# Patient Record
Sex: MALE | Race: WHITE | NOT HISPANIC OR LATINO | Employment: FULL TIME | URBAN - METROPOLITAN AREA
[De-identification: names, ages, dates, MRNs, and addresses within clinical notes are randomized per-mention and may not be internally consistent; named-entity substitution may affect disease eponyms.]

---

## 2023-09-22 ENCOUNTER — OFFICE VISIT (OUTPATIENT)
Dept: PRIMARY CARE | Facility: CLINIC | Age: 33
End: 2023-09-22

## 2023-09-22 VITALS
BODY MASS INDEX: 38.36 KG/M2 | WEIGHT: 315 LBS | SYSTOLIC BLOOD PRESSURE: 128 MMHG | HEIGHT: 76 IN | DIASTOLIC BLOOD PRESSURE: 85 MMHG | HEART RATE: 96 BPM | OXYGEN SATURATION: 94 %

## 2023-09-22 DIAGNOSIS — E78.5 HYPERLIPIDEMIA, UNSPECIFIED HYPERLIPIDEMIA TYPE: ICD-10-CM

## 2023-09-22 DIAGNOSIS — R22.9 MULTIPLE SKIN NODULES: ICD-10-CM

## 2023-09-22 DIAGNOSIS — E03.9 HYPOTHYROIDISM, UNSPECIFIED TYPE: Primary | ICD-10-CM

## 2023-09-22 DIAGNOSIS — M25.50 ARTHRALGIA, UNSPECIFIED JOINT: ICD-10-CM

## 2023-09-22 DIAGNOSIS — R73.9 HYPERGLYCEMIA: ICD-10-CM

## 2023-09-22 PROBLEM — E04.9 NON-TOXIC GOITER: Status: ACTIVE | Noted: 2023-09-22

## 2023-09-22 PROBLEM — M79.671 RIGHT FOOT PAIN: Status: RESOLVED | Noted: 2023-09-22 | Resolved: 2023-09-22

## 2023-09-22 PROBLEM — G47.9 SLEEP DISORDER: Status: ACTIVE | Noted: 2023-09-22

## 2023-09-22 PROBLEM — M54.42 ACUTE LEFT-SIDED LOW BACK PAIN WITH LEFT-SIDED SCIATICA: Status: ACTIVE | Noted: 2023-09-22

## 2023-09-22 PROBLEM — E66.9 OBESITY, UNSPECIFIED: Status: ACTIVE | Noted: 2023-09-22

## 2023-09-22 PROBLEM — R26.89 OTHER ABNORMALITIES OF GAIT AND MOBILITY: Status: ACTIVE | Noted: 2023-09-22

## 2023-09-22 PROBLEM — I87.309 CHRONIC PERIPHERAL VENOUS HYPERTENSION: Status: ACTIVE | Noted: 2023-09-22

## 2023-09-22 PROBLEM — R60.0 LOCALIZED EDEMA: Status: ACTIVE | Noted: 2023-09-22

## 2023-09-22 PROBLEM — E06.3 AUTOIMMUNE THYROIDITIS: Status: ACTIVE | Noted: 2023-09-22

## 2023-09-22 LAB — POC FINGERSTICK BLOOD GLUCOSE: 132 MG/DL (ref 70–100)

## 2023-09-22 PROCEDURE — 1036F TOBACCO NON-USER: CPT | Performed by: FAMILY MEDICINE

## 2023-09-22 PROCEDURE — 82962 GLUCOSE BLOOD TEST: CPT | Performed by: FAMILY MEDICINE

## 2023-09-22 PROCEDURE — 99203 OFFICE O/P NEW LOW 30 MIN: CPT | Performed by: FAMILY MEDICINE

## 2023-09-22 RX ORDER — LEVOTHYROXINE SODIUM 300 UG/1
300 TABLET ORAL
COMMUNITY
End: 2023-09-22 | Stop reason: SDUPTHER

## 2023-09-22 RX ORDER — IBUPROFEN 800 MG/1
600 TABLET ORAL 2 TIMES DAILY
COMMUNITY

## 2023-09-22 RX ORDER — LEVOTHYROXINE SODIUM 300 UG/1
300 TABLET ORAL
Qty: 30 TABLET | Refills: 1 | Status: SHIPPED | OUTPATIENT
Start: 2023-09-22 | End: 2023-11-01 | Stop reason: SDUPTHER

## 2023-09-22 RX ORDER — ACETAMINOPHEN 500 MG
1000 TABLET ORAL
COMMUNITY

## 2023-09-22 ASSESSMENT — ENCOUNTER SYMPTOMS
DYSURIA: 0
VOMITING: 0
ABDOMINAL PAIN: 0
LOSS OF SENSATION IN FEET: 0
CHILLS: 0
BLOOD IN STOOL: 0
NAUSEA: 0
TROUBLE SWALLOWING: 0
LIGHT-HEADEDNESS: 0
DIFFICULTY URINATING: 0
CONFUSION: 0
WEAKNESS: 0
NUMBNESS: 0
SHORTNESS OF BREATH: 0
WHEEZING: 0
COUGH: 0
UNEXPECTED WEIGHT CHANGE: 0
DIZZINESS: 0
FEVER: 0
DIARRHEA: 0
OCCASIONAL FEELINGS OF UNSTEADINESS: 1
DEPRESSION: 1

## 2023-09-22 ASSESSMENT — PATIENT HEALTH QUESTIONNAIRE - PHQ9
6. FEELING BAD ABOUT YOURSELF - OR THAT YOU ARE A FAILURE OR HAVE LET YOURSELF OR YOUR FAMILY DOWN: NEARLY EVERY DAY
4. FEELING TIRED OR HAVING LITTLE ENERGY: NEARLY EVERY DAY
9. THOUGHTS THAT YOU WOULD BE BETTER OFF DEAD, OR OF HURTING YOURSELF: NOT AT ALL
1. LITTLE INTEREST OR PLEASURE IN DOING THINGS: NEARLY EVERY DAY
10. IF YOU CHECKED OFF ANY PROBLEMS, HOW DIFFICULT HAVE THESE PROBLEMS MADE IT FOR YOU TO DO YOUR WORK, TAKE CARE OF THINGS AT HOME, OR GET ALONG WITH OTHER PEOPLE: NOT DIFFICULT AT ALL
2. FEELING DOWN, DEPRESSED OR HOPELESS: NEARLY EVERY DAY
3. TROUBLE FALLING OR STAYING ASLEEP OR SLEEPING TOO MUCH: NOT AT ALL
SUM OF ALL RESPONSES TO PHQ QUESTIONS 1-9: 15
8. MOVING OR SPEAKING SO SLOWLY THAT OTHER PEOPLE COULD HAVE NOTICED. OR THE OPPOSITE, BEING SO FIGETY OR RESTLESS THAT YOU HAVE BEEN MOVING AROUND A LOT MORE THAN USUAL: NOT AT ALL
SUM OF ALL RESPONSES TO PHQ9 QUESTIONS 1 AND 2: 6
5. POOR APPETITE OR OVEREATING: NEARLY EVERY DAY
7. TROUBLE CONCENTRATING ON THINGS, SUCH AS READING THE NEWSPAPER OR WATCHING TELEVISION: NOT AT ALL

## 2023-09-22 NOTE — ASSESSMENT & PLAN NOTE
Right hand likely ganglion cyst. Extensor surfaces of the elbows have appearance of hidradenitis, but non-tender and non-flexural.

## 2023-09-22 NOTE — PATIENT INSTRUCTIONS
Try to minimize ibuprofen and acetaminophen; use the lowest effective dose.    Please return for a follow-up appointment in 3 months, earlier if any question or concern.     Please return or seek medical attention if symptoms persist, change, worsen, or return. For emergencies, call 9-1-1 or go to the nearest Emergency Room.       For assistance with scheduling referrals or consultations, please call [phone]. For laboratory, radiology, and other tests, please call [phone] ([phone] for pediatrics). Please review prescription inserts and published information for possible adverse effects of all medications. Return after testing or consultation to review results and recommendations, if symptoms persist, change, worsen, or return, or if you have any question or concern. If you do not get results within 7-10 days, or you have any question or concern, please send a message, call the office ([phone]), or return to the office for a follow-up appointment. For non-emergencies, you may call the office. For emergencies, call 9-1-1 or go to the nearest Emergency Department. Please schedule additional appointment(s) to address concern(s) not addressed today.    In general, results are not released or discussed over the telephone. Results of tests done through [facility] are released via  [employer] (see below).  [email or web address]   [employer] support line: [phone]    Until we complete our transition to the new system, additional information can be found at [email or web address] or on your Android or iOS (iPhone, iPad) device using the [employer] mitali available free of charge in your device's mitali store.

## 2023-09-22 NOTE — PROGRESS NOTES
"Subjective   Patient ID: Kodi Truong is a 32 y.o. male who presents for Establish Care (Pt presents  as new pt to establish care, will need rx's, discuss something for his pain feels he is using too much Tylenol & Ibuprofen.BL/).  HPI    Previously a patient of Dr. Kiel Pemberton, last seen 3y ago.     Stopped taking Levothyroxine Dec 2022, started to feel unwell April 2023, got a prescription for levothyroxine from urgent care, has been taking 300 mcg (his previous dose) for the last 3 weeks.    Was taking ibuprofen regularly, then combined with acetaminophen, working fairly well. Usually takes ibuprofen 600 mg plus acetaminophen 1000 mg once in the morning, occasionally to rarely in the evening.      Review of Systems   Constitutional:  Negative for chills, fever and unexpected weight change.   HENT:  Negative for ear pain and trouble swallowing.    Respiratory:  Negative for cough, shortness of breath and wheezing.    Cardiovascular:  Negative for chest pain.   Gastrointestinal:  Negative for abdominal pain, blood in stool, diarrhea, nausea and vomiting.   Genitourinary:  Negative for difficulty urinating and dysuria.   Skin:  Negative for rash.   Neurological:  Negative for dizziness, syncope, weakness, light-headedness and numbness.   Psychiatric/Behavioral:  Negative for behavioral problems and confusion.          Objective   /85   Pulse 96   Ht 1.93 m (6' 4\")   Wt (!) 225 kg (497 lb)   SpO2 94%   BMI 60.50 kg/m²     Physical Exam  Vitals and nursing note reviewed.   Constitutional:       General: He is not in acute distress.     Appearance: Normal appearance.   HENT:      Head: Normocephalic and atraumatic.   Eyes:      General: No scleral icterus.     Extraocular Movements: Extraocular movements intact.      Conjunctiva/sclera: Conjunctivae normal.   Pulmonary:      Effort: Pulmonary effort is normal. No respiratory distress.   Musculoskeletal:      Right hand: Deformity (large subcutaneous " non-tender cystic mass dorsal PIP) present.      Left hand: No deformity.   Skin:     General: Skin is warm and dry.      Coloration: Skin is not jaundiced.      Findings: Rash present. Rash is nodular (extensor surfaces of the elbows).   Neurological:      Mental Status: He is alert and oriented to person, place, and time. Mental status is at baseline.   Psychiatric:         Mood and Affect: Mood normal.         Thought Content: Thought content normal.         Assessment/Plan   Problem List Items Addressed This Visit       Hyperlipidemia     Recheck after TSH stabilized or close to the reference range for a few months.         Relevant Orders    Comprehensive Metabolic Panel    Hypothyroidism - Primary     Continue Levothyroxine 300 mcg, and check TSH after having been back on it for at least 6 weeks (so in 3-4 weeks).         Relevant Medications    levothyroxine (Synthroid, Unithroid) 300 mcg tablet    Other Relevant Orders    TSH with reflex to Free T4 if abnormal    Hyperglycemia     R/o severe hyperglycemia today. Check A1c with labs.         Relevant Orders    Hemoglobin A1C    POCT Fingerstick Glucose manually resulted (Completed)    Arthralgia     Check uric acid.         Relevant Orders    Uric acid    Multiple skin nodules     Right hand likely ganglion cyst. Extensor surfaces of the elbows have appearance of hidradenitis, but non-tender and non-flexural.

## 2023-09-22 NOTE — ASSESSMENT & PLAN NOTE
Continue Levothyroxine 300 mcg, and check TSH after having been back on it for at least 6 weeks (so in 3-4 weeks).

## 2023-10-09 ENCOUNTER — APPOINTMENT (OUTPATIENT)
Dept: PRIMARY CARE | Facility: CLINIC | Age: 33
End: 2023-10-09

## 2023-10-24 ENCOUNTER — LAB (OUTPATIENT)
Dept: LAB | Facility: LAB | Age: 33
End: 2023-10-24

## 2023-10-24 DIAGNOSIS — E78.5 HYPERLIPIDEMIA, UNSPECIFIED HYPERLIPIDEMIA TYPE: ICD-10-CM

## 2023-10-24 DIAGNOSIS — E03.9 HYPOTHYROIDISM, UNSPECIFIED TYPE: ICD-10-CM

## 2023-10-24 DIAGNOSIS — M25.50 ARTHRALGIA, UNSPECIFIED JOINT: ICD-10-CM

## 2023-10-24 DIAGNOSIS — R73.9 HYPERGLYCEMIA: ICD-10-CM

## 2023-10-24 LAB
ALBUMIN SERPL BCP-MCNC: 3.8 G/DL (ref 3.4–5)
ALP SERPL-CCNC: 72 U/L (ref 33–120)
ALT SERPL W P-5'-P-CCNC: 34 U/L (ref 10–52)
ANION GAP SERPL CALC-SCNC: 14 MMOL/L (ref 10–20)
AST SERPL W P-5'-P-CCNC: 19 U/L (ref 9–39)
BILIRUB SERPL-MCNC: 0.4 MG/DL (ref 0–1.2)
BUN SERPL-MCNC: 14 MG/DL (ref 6–23)
CALCIUM SERPL-MCNC: 9.3 MG/DL (ref 8.6–10.3)
CHLORIDE SERPL-SCNC: 104 MMOL/L (ref 98–107)
CO2 SERPL-SCNC: 27 MMOL/L (ref 21–32)
CREAT SERPL-MCNC: 0.78 MG/DL (ref 0.5–1.3)
EST. AVERAGE GLUCOSE BLD GHB EST-MCNC: 128 MG/DL
GFR SERPL CREATININE-BSD FRML MDRD: >90 ML/MIN/1.73M*2
GLUCOSE SERPL-MCNC: 99 MG/DL (ref 74–99)
HBA1C MFR BLD: 6.1 %
POTASSIUM SERPL-SCNC: 4.6 MMOL/L (ref 3.5–5.3)
PROT SERPL-MCNC: 6.8 G/DL (ref 6.4–8.2)
SODIUM SERPL-SCNC: 140 MMOL/L (ref 136–145)
TSH SERPL-ACNC: 1.51 MIU/L (ref 0.44–3.98)
URATE SERPL-MCNC: 9.4 MG/DL (ref 4–7.5)

## 2023-10-24 PROCEDURE — 84550 ASSAY OF BLOOD/URIC ACID: CPT

## 2023-10-24 PROCEDURE — 83036 HEMOGLOBIN GLYCOSYLATED A1C: CPT

## 2023-10-24 PROCEDURE — 84443 ASSAY THYROID STIM HORMONE: CPT

## 2023-10-24 PROCEDURE — 36415 COLL VENOUS BLD VENIPUNCTURE: CPT

## 2023-10-24 PROCEDURE — 80053 COMPREHEN METABOLIC PANEL: CPT

## 2023-11-01 DIAGNOSIS — E03.9 HYPOTHYROIDISM, UNSPECIFIED TYPE: ICD-10-CM

## 2023-11-06 DIAGNOSIS — E79.0 HYPERURICEMIA: Primary | ICD-10-CM

## 2023-11-06 DIAGNOSIS — R73.03 PREDIABETES: ICD-10-CM

## 2023-11-06 RX ORDER — LEVOTHYROXINE SODIUM 300 UG/1
300 TABLET ORAL
Qty: 90 TABLET | Refills: 1 | Status: SHIPPED | OUTPATIENT
Start: 2023-11-06 | End: 2024-06-01 | Stop reason: SDUPTHER

## 2024-05-28 PROBLEM — M54.42 ACUTE LEFT-SIDED LOW BACK PAIN WITH LEFT-SIDED SCIATICA: Status: RESOLVED | Noted: 2023-09-22 | Resolved: 2024-05-28

## 2024-06-01 ENCOUNTER — LAB (OUTPATIENT)
Dept: LAB | Facility: LAB | Age: 34
End: 2024-06-01
Payer: COMMERCIAL

## 2024-06-01 ENCOUNTER — OFFICE VISIT (OUTPATIENT)
Dept: PRIMARY CARE | Facility: CLINIC | Age: 34
End: 2024-06-01
Payer: COMMERCIAL

## 2024-06-01 VITALS
WEIGHT: 315 LBS | OXYGEN SATURATION: 96 % | BODY MASS INDEX: 38.36 KG/M2 | DIASTOLIC BLOOD PRESSURE: 80 MMHG | SYSTOLIC BLOOD PRESSURE: 139 MMHG | HEART RATE: 63 BPM | HEIGHT: 76 IN | TEMPERATURE: 97.7 F

## 2024-06-01 DIAGNOSIS — R73.03 PREDIABETES: ICD-10-CM

## 2024-06-01 DIAGNOSIS — M1A.09X0 CHRONIC GOUT OF MULTIPLE SITES, UNSPECIFIED CAUSE: ICD-10-CM

## 2024-06-01 DIAGNOSIS — Z00.00 WELL ADULT HEALTH CHECK: Primary | ICD-10-CM

## 2024-06-01 DIAGNOSIS — K42.9 UMBILICAL HERNIA WITHOUT OBSTRUCTION AND WITHOUT GANGRENE: ICD-10-CM

## 2024-06-01 DIAGNOSIS — E78.5 HYPERLIPIDEMIA, UNSPECIFIED HYPERLIPIDEMIA TYPE: ICD-10-CM

## 2024-06-01 DIAGNOSIS — M67.40 GANGLION: ICD-10-CM

## 2024-06-01 DIAGNOSIS — E03.9 HYPOTHYROIDISM, UNSPECIFIED TYPE: ICD-10-CM

## 2024-06-01 LAB
ALBUMIN SERPL BCP-MCNC: 4.4 G/DL (ref 3.4–5)
ALP SERPL-CCNC: 83 U/L (ref 33–120)
ALT SERPL W P-5'-P-CCNC: 98 U/L (ref 10–52)
ANION GAP SERPL CALC-SCNC: 15 MMOL/L (ref 10–20)
AST SERPL W P-5'-P-CCNC: 59 U/L (ref 9–39)
BILIRUB SERPL-MCNC: 0.6 MG/DL (ref 0–1.2)
BUN SERPL-MCNC: 16 MG/DL (ref 6–23)
CALCIUM SERPL-MCNC: 9.7 MG/DL (ref 8.6–10.3)
CHLORIDE SERPL-SCNC: 103 MMOL/L (ref 98–107)
CHOLEST SERPL-MCNC: 206 MG/DL (ref 0–199)
CHOLESTEROL/HDL RATIO: 6.3
CK SERPL-CCNC: 192 U/L (ref 0–325)
CO2 SERPL-SCNC: 25 MMOL/L (ref 21–32)
CREAT SERPL-MCNC: 0.94 MG/DL (ref 0.5–1.3)
EGFRCR SERPLBLD CKD-EPI 2021: >90 ML/MIN/1.73M*2
ERYTHROCYTE [DISTWIDTH] IN BLOOD BY AUTOMATED COUNT: 15.2 % (ref 11.5–14.5)
EST. AVERAGE GLUCOSE BLD GHB EST-MCNC: 148 MG/DL
GLUCOSE SERPL-MCNC: 101 MG/DL (ref 74–99)
HBA1C MFR BLD: 6.8 %
HCT VFR BLD AUTO: 42.8 % (ref 41–52)
HDLC SERPL-MCNC: 32.5 MG/DL
HGB BLD-MCNC: 13 G/DL (ref 13.5–17.5)
LDLC SERPL CALC-MCNC: 138 MG/DL
MCH RBC QN AUTO: 25.6 PG (ref 26–34)
MCHC RBC AUTO-ENTMCNC: 30.4 G/DL (ref 32–36)
MCV RBC AUTO: 84 FL (ref 80–100)
NON HDL CHOLESTEROL: 174 MG/DL (ref 0–149)
NRBC BLD-RTO: 0 /100 WBCS (ref 0–0)
PLATELET # BLD AUTO: 349 X10*3/UL (ref 150–450)
POTASSIUM SERPL-SCNC: 4.4 MMOL/L (ref 3.5–5.3)
PROT SERPL-MCNC: 7.6 G/DL (ref 6.4–8.2)
RBC # BLD AUTO: 5.08 X10*6/UL (ref 4.5–5.9)
SODIUM SERPL-SCNC: 139 MMOL/L (ref 136–145)
T4 FREE SERPL-MCNC: 1.2 NG/DL (ref 0.61–1.12)
TRIGL SERPL-MCNC: 177 MG/DL (ref 0–149)
TSH SERPL-ACNC: 5.69 MIU/L (ref 0.44–3.98)
URATE SERPL-MCNC: 11.9 MG/DL (ref 4–7.5)
VLDL: 35 MG/DL (ref 0–40)
WBC # BLD AUTO: 8.3 X10*3/UL (ref 4.4–11.3)

## 2024-06-01 PROCEDURE — 84439 ASSAY OF FREE THYROXINE: CPT

## 2024-06-01 PROCEDURE — 90715 TDAP VACCINE 7 YRS/> IM: CPT | Performed by: FAMILY MEDICINE

## 2024-06-01 PROCEDURE — 99395 PREV VISIT EST AGE 18-39: CPT | Performed by: FAMILY MEDICINE

## 2024-06-01 PROCEDURE — 90471 IMMUNIZATION ADMIN: CPT | Performed by: FAMILY MEDICINE

## 2024-06-01 PROCEDURE — 84443 ASSAY THYROID STIM HORMONE: CPT

## 2024-06-01 PROCEDURE — 80053 COMPREHEN METABOLIC PANEL: CPT

## 2024-06-01 PROCEDURE — 1036F TOBACCO NON-USER: CPT | Performed by: FAMILY MEDICINE

## 2024-06-01 PROCEDURE — 80061 LIPID PANEL: CPT

## 2024-06-01 PROCEDURE — 99214 OFFICE O/P EST MOD 30 MIN: CPT | Performed by: FAMILY MEDICINE

## 2024-06-01 PROCEDURE — 85027 COMPLETE CBC AUTOMATED: CPT

## 2024-06-01 PROCEDURE — 83036 HEMOGLOBIN GLYCOSYLATED A1C: CPT

## 2024-06-01 PROCEDURE — 84550 ASSAY OF BLOOD/URIC ACID: CPT

## 2024-06-01 PROCEDURE — 82550 ASSAY OF CK (CPK): CPT

## 2024-06-01 PROCEDURE — 36415 COLL VENOUS BLD VENIPUNCTURE: CPT

## 2024-06-01 RX ORDER — COLCHICINE 0.6 MG/1
TABLET ORAL
Qty: 60 TABLET | Refills: 2 | Status: SHIPPED | OUTPATIENT
Start: 2024-06-01

## 2024-06-01 RX ORDER — ALLOPURINOL 100 MG/1
100 TABLET ORAL 2 TIMES DAILY
Qty: 60 TABLET | Refills: 11 | Status: SHIPPED | OUTPATIENT
Start: 2024-06-01 | End: 2024-06-01

## 2024-06-01 RX ORDER — LEVOTHYROXINE SODIUM 300 UG/1
300 TABLET ORAL
Qty: 90 TABLET | Refills: 1 | Status: SHIPPED | OUTPATIENT
Start: 2024-06-01

## 2024-06-01 RX ORDER — ALLOPURINOL 100 MG/1
100 TABLET ORAL DAILY
Qty: 30 TABLET | Refills: 2 | Status: SHIPPED | OUTPATIENT
Start: 2024-06-01

## 2024-06-01 ASSESSMENT — PATIENT HEALTH QUESTIONNAIRE - PHQ9
1. LITTLE INTEREST OR PLEASURE IN DOING THINGS: NOT AT ALL
10. IF YOU CHECKED OFF ANY PROBLEMS, HOW DIFFICULT HAVE THESE PROBLEMS MADE IT FOR YOU TO DO YOUR WORK, TAKE CARE OF THINGS AT HOME, OR GET ALONG WITH OTHER PEOPLE: SOMEWHAT DIFFICULT
2. FEELING DOWN, DEPRESSED OR HOPELESS: MORE THAN HALF THE DAYS
SUM OF ALL RESPONSES TO PHQ9 QUESTIONS 1 AND 2: 2

## 2024-06-01 ASSESSMENT — PROMIS GLOBAL HEALTH SCALE
RATE_MENTAL_HEALTH: GOOD
RATE_SOCIAL_SATISFACTION: GOOD
CARRYOUT_SOCIAL_ACTIVITIES: GOOD
RATE_PHYSICAL_HEALTH: GOOD
RATE_AVERAGE_FATIGUE: MODERATE
RATE_AVERAGE_PAIN: 5
CARRYOUT_PHYSICAL_ACTIVITIES: MOSTLY
RATE_QUALITY_OF_LIFE: FAIR
RATE_GENERAL_HEALTH: GOOD
EMOTIONAL_PROBLEMS: SOMETIMES

## 2024-06-01 ASSESSMENT — ANXIETY QUESTIONNAIRES
5. BEING SO RESTLESS THAT IT IS HARD TO SIT STILL: NEARLY EVERY DAY
7. FEELING AFRAID AS IF SOMETHING AWFUL MIGHT HAPPEN: NOT AT ALL
GAD7 TOTAL SCORE: 6
4. TROUBLE RELAXING: NOT AT ALL
6. BECOMING EASILY ANNOYED OR IRRITABLE: NOT AT ALL
3. WORRYING TOO MUCH ABOUT DIFFERENT THINGS: NEARLY EVERY DAY
2. NOT BEING ABLE TO STOP OR CONTROL WORRYING: NOT AT ALL
1. FEELING NERVOUS, ANXIOUS, OR ON EDGE: NOT AT ALL
IF YOU CHECKED OFF ANY PROBLEMS ON THIS QUESTIONNAIRE, HOW DIFFICULT HAVE THESE PROBLEMS MADE IT FOR YOU TO DO YOUR WORK, TAKE CARE OF THINGS AT HOME, OR GET ALONG WITH OTHER PEOPLE: SOMEWHAT DIFFICULT

## 2024-06-01 ASSESSMENT — ENCOUNTER SYMPTOMS
SLEEP DISTURBANCE: 1
LOSS OF SENSATION IN FEET: 0
FATIGUE: 1
DIARRHEA: 0
NAUSEA: 0
OCCASIONAL FEELINGS OF UNSTEADINESS: 0
ABDOMINAL PAIN: 1
DYSPHORIC MOOD: 1
ARTHRALGIAS: 1
DEPRESSION: 1
VOMITING: 0
BLOOD IN STOOL: 0

## 2024-06-01 NOTE — PATIENT INSTRUCTIONS
Take the Colchicine 1 tab/capsule daily for a week, then start the Allopurinol.    Monitor your resting blood pressure (BP) and heart rate (HR) at home. Resting BP should be fairly consistently better than 140/90, preferably better than 130/80. Please bring your blood pressure cuff to your appointments.  For a list of validated BP cuffs: https://www.validatebp.org/    General Surgery  Dr. Rick Marie, Jannette Young PA-C, Dr. Edward Vickers, Dr. Sulema Mayorga, Therese Llanes CNP, Dr. Cal Pinon ([place]) [phone]  Dr. Vika Barragan ([place]) [phone]  Dr. Edward Vazquez, Dr. Regan Arboleda, Dr. Judy John, Irasema Aguiar CNP ([place]) [phone]     Hand Surgery  Dr. Ronald Rivera ([place]) [phone]  Dr. Trung Pena ([facility]) [phone]  [facility] [phone]     Please return for a(n) gout, pre-diabetes, cholesterol, blood pressure, medication follow-up appointment in 1 and 3 months, after tests to review results and options, earlier if any question or concern. Please schedule additional problem-focused appointment(s) to address additional problem(s).    Recommended vaccines:  Influenza, annual  TDaP (tetanus-diphtheria-pertussis) vaccine  Avoid taking Biotin (a vitamin, shows up particularly in hair/nail supplements) for a week prior to any blood tests, as it can interfere with certain results. Fasting for labs means 12 hours, nothing to eat or drink, except water and medications, unless directed otherwise.    For assistance with scheduling referrals or consultations, please call [phone]. For laboratory, radiology, and other tests, please call [phone] ([phone] for pediatrics). Please review prescription inserts and published information for possible adverse effects of all medications. Return after testing or consultation to review results and recommendations, if symptoms persist, change, worsen, or return, or if you have any question or concern. If you do not  get results within 7-10 days, or you have any question or concern, please send a message, call the office ([phone]), or return to the office for a follow-up appointment. For non-emergencies, you may call the office. For emergencies, call 9-1-1 or go to the nearest Emergency Department. Please schedule additional appointment(s) to address concern(s) not addressed today.    In general, results are not released or discussed over the telephone, but at an appointment or via  [employer]. Results of tests done through [facility] are released via  [employer] (see below).  [email or web address]   [employer] support line: [phone]

## 2024-06-01 NOTE — PROGRESS NOTES
"Subjective   Patient ID: Kodi Truong is a 33 y.o. male who presents for Annual Exam (Pt presents for wellness, c/o possible hernia since high school, rx needed. BL).  HPI Historian(s): Self    Generally feeling well.     Gout hasn't been flaring up to much, but did flare 6w ago. No longer walking with a cane.    Has been consistent with Levothyroxine for at least the last 6 weeks (since early April).    c/o possible hernia umbilical since high school, is able to \"push guts back in.\"    c/o cyst and decreased ROM right index finger.    Review of Systems   Constitutional:  Positive for fatigue.   Gastrointestinal:  Positive for abdominal pain. Negative for blood in stool, diarrhea, nausea and vomiting.   Musculoskeletal:  Positive for arthralgias.   Psychiatric/Behavioral:  Positive for dysphoric mood and sleep disturbance.    All other systems reviewed and are negative.  Htn  Leg pains from walking rapidly or up hill  Thyroid disease  Possible hernia        Objective   /80   Pulse 63   Temp 36.5 °C (97.7 °F)   Ht 1.937 m (6' 4.25\")   Wt (!) 235 kg (518 lb)   SpO2 96%   BMI 62.64 kg/m²     Physical Exam  Vitals and nursing note reviewed.   Constitutional:       General: He is not in acute distress.     Appearance: Normal appearance. He is not diaphoretic.      Comments: No assistive device presently being used.   HENT:      Head: Normocephalic and atraumatic.   Eyes:      General: No scleral icterus.     Extraocular Movements: Extraocular movements intact.      Conjunctiva/sclera: Conjunctivae normal.   Neck:      Thyroid: No thyroid mass, thyromegaly or thyroid tenderness.   Cardiovascular:      Rate and Rhythm: Normal rate and regular rhythm.      Heart sounds: Normal heart sounds.   Pulmonary:      Effort: Pulmonary effort is normal. No respiratory distress.      Breath sounds: Normal breath sounds.   Abdominal:      General: Bowel sounds are normal. There is no distension.      Palpations: " Abdomen is soft. There is no mass.      Tenderness: There is no abdominal tenderness. There is no guarding or rebound.      Hernia: A hernia (umbilical, easily reducible, minimally tender, no erythema or ecchymosis) is present.   Musculoskeletal:         General: Deformity (right index finger dorsal PIP large ganglion) present.      Right lower leg: No edema.      Left lower leg: No edema.   Skin:     General: Skin is warm and dry.      Coloration: Skin is not jaundiced.   Neurological:      General: No focal deficit present.      Mental Status: He is alert and oriented to person, place, and time. Mental status is at baseline.   Psychiatric:         Mood and Affect: Mood normal.         Behavior: Behavior normal.         Thought Content: Thought content normal.       Assessment/Plan   Problem List Items Addressed This Visit       Hyperlipidemia    Current Assessment & Plan     Recheck.         Relevant Orders    Comprehensive Metabolic Panel    Lipid Panel    Creatine Kinase    Comprehensive Metabolic Panel    Lipid Panel    Creatine Kinase    Follow Up In Primary Care    CBC    Follow Up In Primary Care - Established    Hypothyroidism    Current Assessment & Plan     Recheck.         Relevant Medications    levothyroxine (Synthroid, Unithroid) 300 mcg tablet    Other Relevant Orders    TSH with reflex to Free T4 if abnormal    Follow Up In Primary Care    Prediabetes    Current Assessment & Plan     Recheck A1c.         Relevant Orders    Follow Up In Primary Care    Hemoglobin A1C    Follow Up In Primary Care - Established    Chronic gout of multiple sites    Current Assessment & Plan     Recheck uric acid. Start Allopurinol after a week of colchicine. Recheck in 1 month.         Relevant Medications    colchicine 0.6 mg tablet    allopurinol (Zyloprim) 100 mg tablet    Other Relevant Orders    Uric Acid    Uric Acid    Follow Up In Primary Care    CBC    Referral to Orthopaedic Surgery    Follow Up In Primary  Care - Established    Well adult health check - Primary    Current Assessment & Plan     33yM with gout, possibly familial hyperlipidemia, and hypothyroidism, but doing fairly well.         Umbilical hernia without obstruction and without gangrene    Current Assessment & Plan     Refer to general surgery. ER if any fever/pain/redness/non-reducible.         Relevant Orders    Referral to General Surgery    Ganglion    Current Assessment & Plan     Right index finger. Refer to hand surgery. DDx: uric acid deposition (less likely).         Relevant Orders    Referral to Orthopaedic Surgery                    Lab Results   Component Value Date    LDLCALC 207 (H) 04/05/2021    LDLCALC 154 (H) 01/17/2019    HGBA1C 6.1 (H) 10/24/2023    TSH 1.51 10/24/2023    .40 (H) 04/05/2021    TSH 0.88 10/08/2020

## 2024-06-03 DIAGNOSIS — E11.9 TYPE 2 DIABETES MELLITUS WITHOUT COMPLICATION, WITHOUT LONG-TERM CURRENT USE OF INSULIN (MULTI): Primary | ICD-10-CM

## 2024-06-03 RX ORDER — METFORMIN HYDROCHLORIDE 500 MG/1
1000 TABLET, EXTENDED RELEASE ORAL
Qty: 200 TABLET | Refills: 0 | Status: SHIPPED | OUTPATIENT
Start: 2024-06-03

## 2024-06-04 ENCOUNTER — TELEPHONE (OUTPATIENT)
Dept: PRIMARY CARE | Facility: CLINIC | Age: 34
End: 2024-06-04
Payer: COMMERCIAL

## 2024-06-04 NOTE — TELEPHONE ENCOUNTER
----- Message from Rahul Pete DO sent at 6/3/2024  8:22 AM EDT -----  Please make sure patient is aware of the comments or MyChart message.    A1c is high. A1c of 6.5% or higher indicates a diagnosis of diabetes, and an A1c under 7-8% generally indicates acceptable control of diabetes.  The hemoglobin A1c is a test that gives an indication of 3-month average glucose level.  Recommend a low-sugar, low-simple-carbohydrate, low-cholesterol, heart-healthy diet and lifestyle, and regular cardio exercise and weight loss as appropriate.  Recommend starting a medication, metformin, to help lower your glucose level. As always, you should review all possible adverse effects of any medication, prior to taking it. Recommend follow-up appointments usually every 3 months.  Recommend annual diabetic eye exams.  Recommend regularly checking your glucose level at home.  Additional recommendations can be discussed at your next appointment, or schedule an earlier follow-up if you wish.  Hemoglobin is low (anemia).  But improved compared to previous.  TSH (thyroid-stimulating hormone) is a little high, but your free T4 is actually also high.  Typically these numbers are inversely related (they go in the opposite direction of each other).  Uric acid level is high.  Recommend allopurinol and colchicine, as discussed.

## 2024-06-04 NOTE — TELEPHONE ENCOUNTER
Result Communication    Resulted Orders   TSH with reflex to Free T4 if abnormal   Result Value Ref Range    Thyroid Stimulating Hormone 5.69 (H) 0.44 - 3.98 mIU/L    Narrative    TSH testing is performed using different testing methodology at [facility] than at other [facility]. Direct result comparisons should only be made within the same method.     Uric Acid   Result Value Ref Range    Uric Acid 11.9 (H) 4.0 - 7.5 mg/dL      Comment:      Venipuncture immediately after or during the administration of Metamizole may lead to falsely low results. Testing should be performed immediately  prior to Metamizole dosing.   Comprehensive Metabolic Panel   Result Value Ref Range    Glucose 101 (H) 74 - 99 mg/dL    Sodium 139 136 - 145 mmol/L    Potassium 4.4 3.5 - 5.3 mmol/L    Chloride 103 98 - 107 mmol/L    Bicarbonate 25 21 - 32 mmol/L    Anion Gap 15 10 - 20 mmol/L    Urea Nitrogen 16 6 - 23 mg/dL    Creatinine 0.94 0.50 - 1.30 mg/dL    eGFR >90 >60 mL/min/1.73m*2      Comment:      Calculations of estimated GFR are performed using the 2021 CKD-EPI Study Refit equation without the race variable for the IDMS-Traceable creatinine methods.  https://jasn.asnjournals.org/content/early/2021/09/22/ASN.9007598518    Calcium 9.7 8.6 - 10.3 mg/dL    Albumin 4.4 3.4 - 5.0 g/dL    Alkaline Phosphatase 83 33 - 120 U/L    Total Protein 7.6 6.4 - 8.2 g/dL    AST 59 (H) 9 - 39 U/L    Bilirubin, Total 0.6 0.0 - 1.2 mg/dL    ALT 98 (H) 10 - 52 U/L      Comment:      Patients treated with Sulfasalazine may generate falsely decreased results for ALT.   Lipid Panel   Result Value Ref Range    Cholesterol 206 (H) 0 - 199 mg/dL      Comment:            Age      Desirable   Borderline High   High     0-19 Y     0 - 169       170 - 199     >/= 200    20-24 Y     0 - 189       190 - 224     >/= 225         >24 Y     0 - 199       200 - 239     >/= 240   **All ranges are based on fasting samples. Specific   therapeutic targets  will vary based on patient-specific   cardiac risk.    Pediatric guidelines reference:Pediatrics 2011, 128(S5).Adult guidelines reference: NCEP ATPIII Guidelines,JOHN 2001, 258:2486-97    Venipuncture immediately after or during the administration of Metamizole may lead to falsely low results. Testing should be performed immediately prior to Metamizole dosing.    HDL-Cholesterol 32.5 mg/dL      Comment:        Age       Very Low   Low     Normal    High    0-19 Y    < 35      < 40     40-45     ----  20-24 Y    ----     < 40      >45      ----        >24 Y      ----     < 40     40-60      >60      Cholesterol/HDL Ratio 6.3       Comment:        Ref Values  Desirable  < 3.4  High Risk  > 5.0    LDL Calculated 138 (H) <=99 mg/dL      Comment:                                  Near   Borderline      AGE      Desirable  Optimal    High     High     Very High     0-19 Y     0 - 109     ---    110-129   >/= 130     ----    20-24 Y     0 - 119     ---    120-159   >/= 160     ----      >24 Y     0 -  99   100-129  130-159   160-189     >/=190      VLDL 35 0 - 40 mg/dL    Triglycerides 177 (H) 0 - 149 mg/dL      Comment:         Age         Desirable   Borderline High   High     Very High   0 D-90 D    19 - 174         ----         ----        ----  91 D- 9 Y     0 -  74        75 -  99     >/= 100      ----    10-19 Y     0 -  89        90 - 129     >/= 130      ----    20-24 Y     0 - 114       115 - 149     >/= 150      ----         >24 Y     0 - 149       150 - 199    200- 499    >/= 500    Venipuncture immediately after or during the administration of Metamizole may lead to falsely low results. Testing should be performed immediately prior to Metamizole dosing.    Non HDL Cholesterol 174 (H) 0 - 149 mg/dL      Comment:            Age       Desirable   Borderline High   High     Very High     0-19 Y     0 - 119       120 - 144     >/= 145    >/= 160    20-24 Y     0 - 149       150 - 189     >/= 190      ----          >24 Y    30 mg/dL above LDL Cholesterol goal     Creatine Kinase   Result Value Ref Range    Creatine Kinase 192 0 - 325 U/L   CBC   Result Value Ref Range    WBC 8.3 4.4 - 11.3 x10*3/uL    nRBC 0.0 0.0 - 0.0 /100 WBCs    RBC 5.08 4.50 - 5.90 x10*6/uL    Hemoglobin 13.0 (L) 13.5 - 17.5 g/dL    Hematocrit 42.8 41.0 - 52.0 %    MCV 84 80 - 100 fL    MCH 25.6 (L) 26.0 - 34.0 pg    MCHC 30.4 (L) 32.0 - 36.0 g/dL    RDW 15.2 (H) 11.5 - 14.5 %    Platelets 349 150 - 450 x10*3/uL   Hemoglobin A1C   Result Value Ref Range    Hemoglobin A1C 6.8 (H) see below %    Estimated Average Glucose 148 Not Established mg/dL    Narrative    Diagnosis of Diabetes-Adults  Non-Diabetic: < or = 5.6%  Increased risk for developing diabetes: 5.7-6.4%  Diagnostic of diabetes: > or = 6.5%    Monitoring of Diabetes  Age (y)....................... Therapeutic Goal (%)  Adults: >18.........................<7.0  Pediatrics: 13-18...................<7.5  Pediatrics: 7-12....................<8.0  Pediatrics: 0-6..................... 7.5-8.5    American Diabetes Association. Diabetes Care 33(S1), Jan 2010       Thyroxine, Free   Result Value Ref Range    Thyroxine, Free 1.20 (H) 0.61 - 1.12 ng/dL    Narrative    Thyroxine Free testing is performed using different testing methodology at [facility] than at other [facility] hospitals. Direct result comparisons should only be made within the same method.    Biotin can cause falsely elevated free T4 results. Patients taking a Biotin dose of up to 10 mg/day should refrain from taking Biotin for 24 hours before sample collection. Patient taking a Biotin dose of >10 mg/day should consult with their physician or the laboratory before the blood draw.       2:07 PM      Results were not successfully communicated with the patient and they did not acknowledge their understanding.  Left detailed message on vm with results also has my chart

## 2024-06-20 ENCOUNTER — APPOINTMENT (OUTPATIENT)
Dept: SURGERY | Facility: CLINIC | Age: 34
End: 2024-06-20
Payer: COMMERCIAL

## 2024-06-25 ENCOUNTER — APPOINTMENT (OUTPATIENT)
Dept: SURGERY | Facility: CLINIC | Age: 34
End: 2024-06-25
Payer: COMMERCIAL

## 2024-06-25 ENCOUNTER — APPOINTMENT (OUTPATIENT)
Dept: ORTHOPEDIC SURGERY | Facility: CLINIC | Age: 34
End: 2024-06-25
Payer: COMMERCIAL

## 2024-07-02 ENCOUNTER — APPOINTMENT (OUTPATIENT)
Dept: ORTHOPEDIC SURGERY | Facility: CLINIC | Age: 34
End: 2024-07-02
Payer: COMMERCIAL

## 2024-09-06 ENCOUNTER — APPOINTMENT (OUTPATIENT)
Dept: PRIMARY CARE | Facility: CLINIC | Age: 34
End: 2024-09-06
Payer: COMMERCIAL

## 2024-09-06 VITALS
BODY MASS INDEX: 25.57 KG/M2 | HEART RATE: 63 BPM | WEIGHT: 210 LBS | DIASTOLIC BLOOD PRESSURE: 79 MMHG | HEIGHT: 76 IN | SYSTOLIC BLOOD PRESSURE: 130 MMHG | OXYGEN SATURATION: 95 %

## 2024-09-06 DIAGNOSIS — M1A.09X0 CHRONIC GOUT OF MULTIPLE SITES, UNSPECIFIED CAUSE: ICD-10-CM

## 2024-09-06 DIAGNOSIS — E03.9 ACQUIRED HYPOTHYROIDISM: ICD-10-CM

## 2024-09-06 DIAGNOSIS — E78.2 MIXED HYPERLIPIDEMIA: ICD-10-CM

## 2024-09-06 DIAGNOSIS — E11.9 TYPE 2 DIABETES MELLITUS WITHOUT COMPLICATION, WITHOUT LONG-TERM CURRENT USE OF INSULIN (MULTI): Primary | ICD-10-CM

## 2024-09-06 LAB
POC ALBUMIN /CREATININE RATIO MANUALLY ENTERED: <30 UG/MG CREAT
POC HEMOGLOBIN A1C: 7 % (ref 4.2–6.5)
POC URINE ALBUMIN: 30 MG/L
POC URINE CREATININE: 200 MG/DL

## 2024-09-06 PROCEDURE — 3044F HG A1C LEVEL LT 7.0%: CPT | Performed by: FAMILY MEDICINE

## 2024-09-06 PROCEDURE — 3008F BODY MASS INDEX DOCD: CPT | Performed by: FAMILY MEDICINE

## 2024-09-06 PROCEDURE — 3050F LDL-C >= 130 MG/DL: CPT | Performed by: FAMILY MEDICINE

## 2024-09-06 PROCEDURE — 3075F SYST BP GE 130 - 139MM HG: CPT | Performed by: FAMILY MEDICINE

## 2024-09-06 PROCEDURE — 90677 PCV20 VACCINE IM: CPT | Performed by: FAMILY MEDICINE

## 2024-09-06 PROCEDURE — 3078F DIAST BP <80 MM HG: CPT | Performed by: FAMILY MEDICINE

## 2024-09-06 PROCEDURE — 90472 IMMUNIZATION ADMIN EACH ADD: CPT | Performed by: FAMILY MEDICINE

## 2024-09-06 PROCEDURE — 90656 IIV3 VACC NO PRSV 0.5 ML IM: CPT | Performed by: FAMILY MEDICINE

## 2024-09-06 PROCEDURE — 82044 UR ALBUMIN SEMIQUANTITATIVE: CPT | Performed by: FAMILY MEDICINE

## 2024-09-06 PROCEDURE — 83036 HEMOGLOBIN GLYCOSYLATED A1C: CPT | Performed by: FAMILY MEDICINE

## 2024-09-06 PROCEDURE — 90471 IMMUNIZATION ADMIN: CPT | Performed by: FAMILY MEDICINE

## 2024-09-06 PROCEDURE — 99214 OFFICE O/P EST MOD 30 MIN: CPT | Performed by: FAMILY MEDICINE

## 2024-09-06 RX ORDER — TIRZEPATIDE 5 MG/.5ML
5 INJECTION, SOLUTION SUBCUTANEOUS
Qty: 6 ML | Refills: 0 | Status: SHIPPED | OUTPATIENT
Start: 2024-09-27

## 2024-09-06 RX ORDER — TIRZEPATIDE 2.5 MG/.5ML
2.5 INJECTION, SOLUTION SUBCUTANEOUS
Qty: 2 ML | Refills: 0 | Status: SHIPPED | OUTPATIENT
Start: 2024-09-08 | End: 2024-09-30

## 2024-09-06 RX ORDER — ALLOPURINOL 100 MG/1
100 TABLET ORAL DAILY
Qty: 30 TABLET | Refills: 2 | Status: SHIPPED | OUTPATIENT
Start: 2024-09-06 | End: 2024-09-06

## 2024-09-06 RX ORDER — COLCHICINE 0.6 MG/1
TABLET ORAL
Qty: 60 TABLET | Refills: 2 | Status: SHIPPED | OUTPATIENT
Start: 2024-09-06

## 2024-09-06 RX ORDER — ALLOPURINOL 100 MG/1
100 TABLET ORAL DAILY
Qty: 100 TABLET | Refills: 3 | Status: SHIPPED | OUTPATIENT
Start: 2024-09-06

## 2024-09-06 ASSESSMENT — ENCOUNTER SYMPTOMS
CHEST TIGHTNESS: 0
LIGHT-HEADEDNESS: 0
APNEA: 0
UNEXPECTED WEIGHT CHANGE: 0
DIZZINESS: 0
PALPITATIONS: 0
HEADACHES: 0
CHOKING: 0
COUGH: 0
WHEEZING: 0
SHORTNESS OF BREATH: 0
DIAPHORESIS: 0
CHILLS: 0
FEVER: 0

## 2024-09-06 NOTE — ASSESSMENT & PLAN NOTE
Well controlled. Stable.  Orders:    Follow Up In Primary Care    colchicine 0.6 mg tablet; Take 2, then an hour later take 1, q72h, PRN gouty flare. Take 1 daily, if directed to do so, for maintenance or starting a uricosuric.    allopurinol (Zyloprim) 100 mg tablet; Take 1 tablet (100 mg) by mouth once daily.    Uric Acid; Future

## 2024-09-06 NOTE — PATIENT INSTRUCTIONS
Recommend seeing an eye doctor at least annually for a diabetic eye exam. Be sure to visually inspect your feet every day, looking for cuts, scrapes, infections. Propping a mirror against the wall at an angle can be used to help you see the bottoms of your feet, if necessary.  For non-insulin dependents diabetes, check your fasting (12 hours) glucose at least once a week.  Check your glucose 2 to 4 hours after meals, to help understand how different foods affect your glucose. Use this information to help guide food choices, to minimize spikes in glucose. If frequently getting numbers > 200, a medication change or improved medication adherence might be necessary. Seek immediate medical attention (ER, 911) for glucose < 55 or > 400, altered consciousness, seizure, significant dehydration, or other significant concern.  Advise follow-up appointments usually every 3 months.    Therapeutic lifestyle changes are the cornerstone of weight loss. In general, a weight loss program should have a balanced, calorie-appropriate, and sustainable (i.e., lifelong) and otherwise healthy diet. A multivitamin might help ensure adequate intake of the nutrients it contains during weight loss. A reasonable rate of weight loss is usually 0.5 to 1 pound per week. No more than 2 lbs weight loss per week. Exercise to help maintain muscle mass (and for overall health) is usually advisable.    Several medications may be used to assist with weight loss, e.g., Wegovy, Zepbound, Saxenda, Contrave, John/orlistat (over-the-counter). I do not recommend Adipex/phentermine.    In general, weight loss supplements have not been proven effective. Depending on the ingredients, there are many herbal supplements that, though they might generally be safe, do have the potential to cause, e.g., liver problems, increased risk of bleeding. Because many if not most herbal supplements have not been thoroughly studied, and are not as tightly regulated as  FDA-approved medications, there may be inconsistent bioavailability and unpredictable drug interactions.    Please see the following page for some weight loss resources available at [facility]:  [email or web address]       Please return for a(n) diabetes, weight, and medication in 3 months, after tests to review results and options, earlier if any question or concern. Please schedule additional problem-focused appointment(s) to address additional problem(s).      Avoid taking Biotin (a vitamin, shows up particularly in hair/nail supplements) for a week prior to any blood tests, as it can interfere with certain results. Fasting for labs means 12 hours, nothing to eat or drink, except water and medications, unless directed otherwise.    For assistance with scheduling referrals or consultations, please call [phone]. For laboratory, radiology, and other tests, please call [phone] ([phone] for pediatrics). Please review prescription inserts and published information for possible adverse effects of all medications. Return after testing or consultation to review results and recommendations, if symptoms persist, change, worsen, or return, or if you have any question or concern. If you do not get results within 7-10 days, or you have any question or concern, please send a message, call the office ([phone]), or return to the office for a follow-up appointment. For non-emergencies, you may call the office. For emergencies, call 9-1-1 or go to the nearest Emergency Department. Please schedule additional appointment(s) to address concern(s) not addressed today.    In general, results are not released or discussed over the telephone, but at an appointment or via  [employer]. Results of tests done through [facility] are released via  [employer] (see  below).  [email or web address]  [facility] MyChart support line: [phone]

## 2024-09-06 NOTE — ASSESSMENT & PLAN NOTE
Recheck.  Orders:    Follow Up In Primary Care    TSH with reflex to Free T4 if abnormal; Future    Follow Up In Primary Care; Future

## 2024-09-06 NOTE — PROGRESS NOTES
"Subjective   Patient ID: Kodi Truong is a 33 y.o. male who presents for Follow-up (Pt presents for 3 month check up, wants to discuss possibly Wegovy or Ozempic, no other concerns, may need rx's. BL).  HPI Historian(s): Self    Has been feeling really well. Did manage to lose about 25 lbs. No gout flare ups.    Denies FMHx or PMHx (medullary) thyroid cancer, MEN-2, PMHx pancreatitis.     Is not checking glucose regularly.  Is not getting significant lows.  Is not having problems with medication.  Is seeing eye doctor at least annually.  Denies numbness, tingling, wound(s), dry skin, thick nails, and mycotic nails  Is inspecting feet daily.         Review of Systems   Constitutional:  Negative for chills, diaphoresis, fever and unexpected weight change.   Eyes:  Negative for visual disturbance.   Respiratory:  Negative for apnea (no PND), cough, choking, chest tightness, shortness of breath and wheezing.    Cardiovascular:  Positive for leg swelling. Negative for chest pain and palpitations.   Neurological:  Negative for dizziness, syncope, light-headedness and headaches.   All other systems reviewed and are negative.      Patient Care Team:  Rahul Pete DO as PCP - General (Family Medicine)  Rahul Pete DO as PCP - MMO [facility] PCP  Kiel Pemberton MD (Family Medicine)    Objective   /79   Pulse 63   Ht 1.937 m (6' 4.25\")   Wt 95.3 kg (210 lb)   SpO2 95%   BMI 25.39 kg/m²         Physical Exam  Vitals and nursing note reviewed.   Constitutional:       General: He is not in acute distress.     Appearance: Normal appearance.      Comments: No assistive device presently being used.   HENT:      Head: Normocephalic and atraumatic.   Eyes:      General: No scleral icterus.     Extraocular Movements: Extraocular movements intact.      Conjunctiva/sclera: Conjunctivae normal.   Pulmonary:      Effort: Pulmonary effort is normal. No respiratory distress.   Skin:     General: Skin is warm and dry. "      Coloration: Skin is not jaundiced.   Neurological:      Mental Status: He is alert and oriented to person, place, and time. Mental status is at baseline.   Psychiatric:         Behavior: Behavior normal.       Assessment & Plan  Type 2 diabetes mellitus without complication, without long-term current use of insulin (Multi)  Add Mounjaro. Return 3 months.  Orders:  •  Follow Up In Primary Care  •  tirzepatide (Mounjaro) 2.5 mg/0.5 mL pen injector; Inject 2.5 mg under the skin 1 (one) time per week for 4 doses. Then increase to 5 mg dose.  •  tirzepatide (Mounjaro) 5 mg/0.5 mL pen injector; Inject 5 mg under the skin 1 (one) time per week. Do not fill before September 27, 2024.  •  POCT Albumin random urine manually resulted  •  POCT glycosylated hemoglobin (Hb A1C) manually resulted  •  Follow Up In Primary Care; Future  •  Hemoglobin A1C; Future    Acquired hypothyroidism  Recheck.  Orders:  •  Follow Up In Primary Care  •  TSH with reflex to Free T4 if abnormal; Future  •  Follow Up In Primary Care; Future    Chronic gout of multiple sites, unspecified cause  Well controlled. Stable.  Orders:  •  Follow Up In Primary Care  •  colchicine 0.6 mg tablet; Take 2, then an hour later take 1, q72h, PRN gouty flare. Take 1 daily, if directed to do so, for maintenance or starting a uricosuric.  •  allopurinol (Zyloprim) 100 mg tablet; Take 1 tablet (100 mg) by mouth once daily.  •  Uric Acid; Future    Mixed hyperlipidemia  Recheck.  Orders:  •  Follow Up In Primary Care  •  Lipid Panel; Future  •  Comprehensive Metabolic Panel; Future  •  Creatine Kinase; Future

## 2024-09-06 NOTE — ASSESSMENT & PLAN NOTE
Recheck.  Orders:    Follow Up In Primary Care    Lipid Panel; Future    Comprehensive Metabolic Panel; Future    Creatine Kinase; Future

## 2024-09-06 NOTE — ASSESSMENT & PLAN NOTE
Add Mounjaro. Return 3 months.  Orders:    Follow Up In Primary Care    tirzepatide (Mounjaro) 2.5 mg/0.5 mL pen injector; Inject 2.5 mg under the skin 1 (one) time per week for 4 doses. Then increase to 5 mg dose.    tirzepatide (Mounjaro) 5 mg/0.5 mL pen injector; Inject 5 mg under the skin 1 (one) time per week. Do not fill before September 27, 2024.    POCT Albumin random urine manually resulted    POCT glycosylated hemoglobin (Hb A1C) manually resulted    Follow Up In Primary Care; Future    Hemoglobin A1C; Future

## 2024-09-07 ENCOUNTER — LAB (OUTPATIENT)
Dept: LAB | Facility: LAB | Age: 34
End: 2024-09-07
Payer: COMMERCIAL

## 2024-09-07 DIAGNOSIS — E03.9 ACQUIRED HYPOTHYROIDISM: ICD-10-CM

## 2024-09-07 DIAGNOSIS — E78.2 MIXED HYPERLIPIDEMIA: ICD-10-CM

## 2024-09-07 LAB
ALBUMIN SERPL BCP-MCNC: 4.1 G/DL (ref 3.4–5)
ALP SERPL-CCNC: 74 U/L (ref 33–120)
ALT SERPL W P-5'-P-CCNC: 156 U/L (ref 10–52)
ANION GAP SERPL CALC-SCNC: 16 MMOL/L (ref 10–20)
AST SERPL W P-5'-P-CCNC: 97 U/L (ref 9–39)
BILIRUB SERPL-MCNC: 0.3 MG/DL (ref 0–1.2)
BUN SERPL-MCNC: 13 MG/DL (ref 6–23)
CALCIUM SERPL-MCNC: 9.1 MG/DL (ref 8.6–10.3)
CHLORIDE SERPL-SCNC: 102 MMOL/L (ref 98–107)
CHOLEST SERPL-MCNC: 210 MG/DL (ref 0–199)
CHOLESTEROL/HDL RATIO: 5.8
CK SERPL-CCNC: 103 U/L (ref 0–325)
CO2 SERPL-SCNC: 26 MMOL/L (ref 21–32)
CREAT SERPL-MCNC: 0.81 MG/DL (ref 0.5–1.3)
EGFRCR SERPLBLD CKD-EPI 2021: >90 ML/MIN/1.73M*2
GLUCOSE SERPL-MCNC: 117 MG/DL (ref 74–99)
HDLC SERPL-MCNC: 35.9 MG/DL
LDLC SERPL CALC-MCNC: 143 MG/DL
NON HDL CHOLESTEROL: 174 MG/DL (ref 0–149)
POTASSIUM SERPL-SCNC: 4.4 MMOL/L (ref 3.5–5.3)
PROT SERPL-MCNC: 7.1 G/DL (ref 6.4–8.2)
SODIUM SERPL-SCNC: 140 MMOL/L (ref 136–145)
TRIGL SERPL-MCNC: 156 MG/DL (ref 0–149)
TSH SERPL-ACNC: 3.69 MIU/L (ref 0.44–3.98)
VLDL: 31 MG/DL (ref 0–40)

## 2024-09-07 PROCEDURE — 80053 COMPREHEN METABOLIC PANEL: CPT

## 2024-09-07 PROCEDURE — 82550 ASSAY OF CK (CPK): CPT

## 2024-09-07 PROCEDURE — 80061 LIPID PANEL: CPT

## 2024-09-07 PROCEDURE — 36415 COLL VENOUS BLD VENIPUNCTURE: CPT

## 2024-09-07 PROCEDURE — 84443 ASSAY THYROID STIM HORMONE: CPT

## 2024-09-11 ENCOUNTER — TELEPHONE (OUTPATIENT)
Dept: PRIMARY CARE | Facility: CLINIC | Age: 34
End: 2024-09-11
Payer: COMMERCIAL

## 2024-09-11 NOTE — TELEPHONE ENCOUNTER
"----- Message from Rahul Pete sent at 9/10/2024  3:06 PM EDT -----  Please make sure patient is aware of the comments or MyChart message.    Liver enzyme(s) elevated. Recommend a low-sugar, low-simple-carbohydrate, low-fat, heart-healthy diet, weight, and lifestyle, and avoiding alcohol, NSAIDs, and Acetaminophen.    LDL (a \"bad\" cholesterol) is not at goal of < 70.  Generally, would recommend a statin (e.g., Lipitor/atorvastatin). However, a statin might worsen the elevated liver enzymes.  Recommend a low-sugar, low-fat, low-cholesterol, high-fiber, heart-healthy diet and lifestyle, maintaining a healthy blood pressure, and regular cardio exercise and weight loss as appropriate.  Return for a follow-up appointment to discuss results and options, if any question or concern.    Recommend follow-up with a liver specialist (hepatologist or gastroenterologist).    Hepatology  Dr. Valentino Brock ([place]) [phone]  Dr. Kalin Louis, Dr. Bran Lora, Dr. Filipe Ware, Dr. Malik Coyne ([facility] [phone]    Gastroenterology  Dr. Nydia Baltazar, Dr. Reilly Handy ([place]) [phone]  Dr. Pamela Dejesus, Dr. Alissa Cary, Dr. Mark Cline, Dr. Russ Sawant, Dr. Regan Dela Cruz ([place]) [phone]  "

## 2024-09-11 NOTE — TELEPHONE ENCOUNTER
Result Communication    Resulted Orders   TSH with reflex to Free T4 if abnormal   Result Value Ref Range    Thyroid Stimulating Hormone 3.69 0.44 - 3.98 mIU/L    Narrative    TSH testing is performed using different testing methodology at [facility]) than at other [facility] hospitals. Direct result comparisons should only be made within the same method.     Lipid Panel   Result Value Ref Range    Cholesterol 210 (H) 0 - 199 mg/dL      Comment:            Age      Desirable   Borderline High   High     0-19 Y     0 - 169       170 - 199     >/= 200    20-24 Y     0 - 189       190 - 224     >/= 225         >24 Y     0 - 199       200 - 239     >/= 240   **All ranges are based on fasting samples. Specific   therapeutic targets will vary based on patient-specific   cardiac risk.    Pediatric guidelines reference:Pediatrics 2011, 128(S5).Adult guidelines reference: NCEP ATPIII Guidelines,JOHN 2001, 258:2486-97    Venipuncture immediately after or during the administration of Metamizole may lead to falsely low results. Testing should be performed immediately prior to Metamizole dosing.    HDL-Cholesterol 35.9 mg/dL      Comment:        Age       Very Low   Low     Normal    High    0-19 Y    < 35      < 40     40-45     ----  20-24 Y    ----     < 40      >45      ----        >24 Y      ----     < 40     40-60      >60      Cholesterol/HDL Ratio 5.8       Comment:        Ref Values  Desirable  < 3.4  High Risk  > 5.0    LDL Calculated 143 (H) <=99 mg/dL      Comment:                                  Near   Borderline      AGE      Desirable  Optimal    High     High     Very High     0-19 Y     0 - 109     ---    110-129   >/= 130     ----    20-24 Y     0 - 119     ---    120-159   >/= 160     ----      >24 Y     0 -  99   100-129  130-159   160-189     >/=190      VLDL 31 0 - 40 mg/dL    Triglycerides 156 (H) 0 - 149 mg/dL      Comment:         Age         Desirable   Borderline High   High     Very High   0  D-90 D    19 - 174         ----         ----        ----  91 D- 9 Y     0 -  74        75 -  99     >/= 100      ----    10-19 Y     0 -  89        90 - 129     >/= 130      ----    20-24 Y     0 - 114       115 - 149     >/= 150      ----         >24 Y     0 - 149       150 - 199    200- 499    >/= 500    Venipuncture immediately after or during the administration of Metamizole may lead to falsely low results. Testing should be performed immediately prior to Metamizole dosing.    Non HDL Cholesterol 174 (H) 0 - 149 mg/dL      Comment:            Age       Desirable   Borderline High   High     Very High     0-19 Y     0 - 119       120 - 144     >/= 145    >/= 160    20-24 Y     0 - 149       150 - 189     >/= 190      ----         >24 Y    30 mg/dL above LDL Cholesterol goal     Comprehensive Metabolic Panel   Result Value Ref Range    Glucose 117 (H) 74 - 99 mg/dL    Sodium 140 136 - 145 mmol/L    Potassium 4.4 3.5 - 5.3 mmol/L    Chloride 102 98 - 107 mmol/L    Bicarbonate 26 21 - 32 mmol/L    Anion Gap 16 10 - 20 mmol/L    Urea Nitrogen 13 6 - 23 mg/dL    Creatinine 0.81 0.50 - 1.30 mg/dL    eGFR >90 >60 mL/min/1.73m*2      Comment:      Calculations of estimated GFR are performed using the 2021 CKD-EPI Study Refit equation without the race variable for the IDMS-Traceable creatinine methods.  https://jasn.asnjournals.org/content/early/2021/09/22/ASN.3484394141    Calcium 9.1 8.6 - 10.3 mg/dL    Albumin 4.1 3.4 - 5.0 g/dL    Alkaline Phosphatase 74 33 - 120 U/L    Total Protein 7.1 6.4 - 8.2 g/dL    AST 97 (H) 9 - 39 U/L    Bilirubin, Total 0.3 0.0 - 1.2 mg/dL     (H) 10 - 52 U/L      Comment:      Patients treated with Sulfasalazine may generate falsely decreased results for ALT.   Creatine Kinase   Result Value Ref Range    Creatine Kinase 103 0 - 325 U/L       3:45 PM  DO ANTONIETA Brionescone8 [facility] Clinical Support Staff  Please make sure patient is aware of the comments or MyChart  "message.    Liver enzyme(s) elevated. Recommend a low-sugar, low-simple-carbohydrate, low-fat, heart-healthy diet, weight, and lifestyle, and avoiding alcohol, NSAIDs, and Acetaminophen.    LDL (a \"bad\" cholesterol) is not at goal of < 70.  Generally, would recommend a statin (e.g., Lipitor/atorvastatin). However, a statin might worsen the elevated liver enzymes.  Recommend a low-sugar, low-fat, low-cholesterol, high-fiber, heart-healthy diet and lifestyle, maintaining a healthy blood pressure, and regular cardio exercise and weight loss as appropriate.  Return for a follow-up appointment to discuss results and options, if any question or concern.    Recommend follow-up with a liver specialist (hepatologist or gastroenterologist).    Hepatology  Dr. Valentino Brock ([place]) [phone]  Dr. Kalin Louis, Dr. Bran Lora, Dr. Filipe Ware, Dr. Malik Coyne ([place]) [phone]    Gastroenterology  Dr. Nydia Baltazar, Dr. Reilly Handy ([place]) [phone]  Dr. Pamela Dejesus, Dr. Alissa Cary, Dr. Mark Cline, Dr. Russ Sawant, Dr. Regan Dela Cruz ([place]) [phone]    Results were successfully communicated with the patient and they acknowledged their understanding.    "

## 2024-12-10 DIAGNOSIS — E11.9 TYPE 2 DIABETES MELLITUS WITHOUT COMPLICATION, WITHOUT LONG-TERM CURRENT USE OF INSULIN (MULTI): ICD-10-CM

## 2024-12-10 DIAGNOSIS — E03.9 HYPOTHYROIDISM, UNSPECIFIED TYPE: ICD-10-CM

## 2024-12-10 DIAGNOSIS — M1A.09X0 CHRONIC GOUT OF MULTIPLE SITES, UNSPECIFIED CAUSE: ICD-10-CM

## 2024-12-10 RX ORDER — METFORMIN HYDROCHLORIDE 500 MG/1
1000 TABLET, EXTENDED RELEASE ORAL
Qty: 200 TABLET | Refills: 0 | Status: SHIPPED | OUTPATIENT
Start: 2024-12-10

## 2024-12-10 RX ORDER — ALLOPURINOL 100 MG/1
100 TABLET ORAL DAILY
Qty: 100 TABLET | Refills: 3 | Status: SHIPPED | OUTPATIENT
Start: 2024-12-10

## 2024-12-10 RX ORDER — COLCHICINE 0.6 MG/1
TABLET ORAL
Qty: 60 TABLET | Refills: 2 | Status: SHIPPED | OUTPATIENT
Start: 2024-12-10

## 2024-12-10 RX ORDER — LEVOTHYROXINE SODIUM 300 UG/1
300 TABLET ORAL
Qty: 90 TABLET | Refills: 1 | Status: SHIPPED | OUTPATIENT
Start: 2024-12-10

## 2025-07-29 ENCOUNTER — TELEPHONE (OUTPATIENT)
Dept: PRIMARY CARE | Facility: CLINIC | Age: 35
End: 2025-07-29
Payer: COMMERCIAL

## 2025-07-29 DIAGNOSIS — M1A.09X0 CHRONIC GOUT OF MULTIPLE SITES, UNSPECIFIED CAUSE: ICD-10-CM

## 2025-07-29 DIAGNOSIS — E11.9 TYPE 2 DIABETES MELLITUS WITHOUT COMPLICATION, WITHOUT LONG-TERM CURRENT USE OF INSULIN: Primary | ICD-10-CM

## 2025-07-29 DIAGNOSIS — E03.9 HYPOTHYROIDISM, UNSPECIFIED TYPE: ICD-10-CM

## 2025-07-29 DIAGNOSIS — E03.9 ACQUIRED HYPOTHYROIDISM: ICD-10-CM

## 2025-07-29 DIAGNOSIS — E78.2 MIXED HYPERLIPIDEMIA: ICD-10-CM

## 2025-07-29 RX ORDER — LEVOTHYROXINE SODIUM 300 UG/1
300 TABLET ORAL
Qty: 30 TABLET | Refills: 0 | Status: SHIPPED | OUTPATIENT
Start: 2025-07-29

## 2025-07-29 RX ORDER — COLCHICINE 0.6 MG/1
TABLET ORAL
Qty: 30 TABLET | Refills: 0 | Status: SHIPPED | OUTPATIENT
Start: 2025-07-29

## 2025-07-29 RX ORDER — ALLOPURINOL 100 MG/1
100 TABLET ORAL DAILY
Qty: 30 TABLET | Refills: 0 | Status: SHIPPED | OUTPATIENT
Start: 2025-07-29

## 2025-07-29 RX ORDER — METFORMIN HYDROCHLORIDE 500 MG/1
1000 TABLET, EXTENDED RELEASE ORAL
Qty: 60 TABLET | Refills: 0 | Status: SHIPPED | OUTPATIENT
Start: 2025-07-29

## 2025-07-29 NOTE — TELEPHONE ENCOUNTER
Really does need labs done, and to be seen at an appointment. Orders entered for fasting labs. Ok to schedule on a Saturday. Please provide information regarding Medicaid, free clinics, in case that's what might be helpful.    Refill for 30 days has been sent.